# Patient Record
Sex: FEMALE | Race: WHITE | NOT HISPANIC OR LATINO | Employment: UNEMPLOYED | URBAN - NONMETROPOLITAN AREA
[De-identification: names, ages, dates, MRNs, and addresses within clinical notes are randomized per-mention and may not be internally consistent; named-entity substitution may affect disease eponyms.]

---
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
## 2022-01-25 ENCOUNTER — TELEPHONE (OUTPATIENT)
Dept: FAMILY MEDICINE CLINIC | Facility: CLINIC | Age: 22
End: 2022-01-25

## 2023-07-31 ENCOUNTER — APPOINTMENT (EMERGENCY)
Dept: RADIOLOGY | Facility: HOSPITAL | Age: 23
End: 2023-07-31
Payer: COMMERCIAL

## 2023-07-31 ENCOUNTER — HOSPITAL ENCOUNTER (EMERGENCY)
Facility: HOSPITAL | Age: 23
Discharge: HOME | End: 2023-07-31
Attending: EMERGENCY MEDICINE
Payer: COMMERCIAL

## 2023-07-31 VITALS
HEART RATE: 73 BPM | RESPIRATION RATE: 17 BRPM | OXYGEN SATURATION: 97 % | BODY MASS INDEX: 32.43 KG/M2 | HEIGHT: 65 IN | DIASTOLIC BLOOD PRESSURE: 79 MMHG | SYSTOLIC BLOOD PRESSURE: 120 MMHG | WEIGHT: 194.67 LBS | TEMPERATURE: 97.5 F

## 2023-07-31 DIAGNOSIS — S03.00XA: Primary | ICD-10-CM

## 2023-07-31 PROCEDURE — 99284 EMERGENCY DEPT VISIT MOD MDM: CPT | Performed by: EMERGENCY MEDICINE

## 2023-07-31 PROCEDURE — 99283 EMERGENCY DEPT VISIT LOW MDM: CPT

## 2023-07-31 PROCEDURE — 21480 CLTX TMPRMAND DISLC 1ST/SBSQ: CPT | Performed by: EMERGENCY MEDICINE

## 2023-07-31 PROCEDURE — 96374 THER/PROPH/DIAG INJ IV PUSH: CPT

## 2023-07-31 PROCEDURE — 70330 X-RAY EXAM OF JAW JOINTS: CPT

## 2023-07-31 RX ORDER — ONDANSETRON 4 MG/1
4 TABLET, FILM COATED ORAL EVERY 8 HOURS PRN
Qty: 12 TABLET | Refills: 0 | Status: SHIPPED | OUTPATIENT
Start: 2023-07-31

## 2023-07-31 RX ORDER — KETOROLAC TROMETHAMINE 30 MG/ML
30 INJECTION, SOLUTION INTRAMUSCULAR; INTRAVENOUS ONCE
Status: DISCONTINUED | OUTPATIENT
Start: 2023-07-31 | End: 2023-07-31

## 2023-07-31 RX ORDER — FENTANYL CITRATE 50 UG/ML
50 INJECTION, SOLUTION INTRAMUSCULAR; INTRAVENOUS ONCE
Status: COMPLETED | OUTPATIENT
Start: 2023-07-31 | End: 2023-07-31

## 2023-07-31 RX ORDER — NAPROXEN 500 MG/1
500 TABLET ORAL 2 TIMES DAILY WITH MEALS
Qty: 10 TABLET | Refills: 0 | Status: SHIPPED | OUTPATIENT
Start: 2023-07-31 | End: 2023-08-05

## 2023-07-31 RX ORDER — ONDANSETRON 2 MG/ML
4 INJECTION INTRAMUSCULAR; INTRAVENOUS ONCE
Status: DISCONTINUED | OUTPATIENT
Start: 2023-07-31 | End: 2023-07-31 | Stop reason: HOSPADM

## 2023-07-31 RX ORDER — LORAZEPAM 2 MG/ML
1 INJECTION INTRAMUSCULAR ONCE
Status: DISCONTINUED | OUTPATIENT
Start: 2023-07-31 | End: 2023-07-31 | Stop reason: HOSPADM

## 2023-07-31 RX ADMIN — FENTANYL CITRATE 50 MCG: 50 INJECTION, SOLUTION INTRAMUSCULAR; INTRAVENOUS at 19:07

## 2023-07-31 NOTE — ED PROVIDER NOTES
Final Diagnosis:  1. Closed dislocation of mandible        Chief Complaint   Patient presents with   • Jaw Pain     About an hour a ago pt yawned and feels now like her jaw is out of place     HPI  Patient presents for evaluation of inability to close jaw. History is provided by family who are bedside. They state that approximately an hour prior to arrival the patient was yawning and then she was unable to close her mouth. She seems to endorse some pain in her mandibular area but cannot really tell me any other information. Denies that this is ever happened before. No trauma. Unless otherwise specified:  - No language barrier.   - History obtained from patient. - There are no limitations to the history obtained. - Previous charting was reviewed    PMH:   has a past medical history of Anxiety. PSH:   has no past surgical history on file. ROS:  Review of Systems   - 13 point ROS was performed and all are normal unless stated in the history above. - Nursing note reviewed. Vitals reviewed. - Orders placed by myself and/or advanced practitioner / resident. PE:   Vitals:    07/31/23 1708 07/31/23 1947   BP: 120/79    BP Location: Right arm    Pulse: 88 73   Resp: 17    Temp: 97.5 °F (36.4 °C)    TempSrc: Temporal    SpO2: 99% 97%   Weight: 88.3 kg (194 lb 10.7 oz)    Height: 5' 5" (1.651 m)      Vitals reviewed by me. Patient's mandible appears to be dislocated, there is a fullness bilateral Sherrine Grapes over the area of the TMJ/angle of the mandible. There is no obvious bony deformity. Patient's mouth is held agape, intermittent drooling. Unless otherwise specified above:    General: VS reviewed  Appears in NAD    Head: Normocephalic, atraumatic  Eyes: EOM-I.     ENT: Atraumatic external nose and ears. No drooling. Neck: No JVD.     CV: No pallor noted  Lungs:   No tachypnea  No respiratory distress    Abdomen:  Soft, non-tender, non-distended    MSK:   No obvious deformity    Skin: Dry, intact. No obvious rash. Psychiatric/Behavioral: Appropriate mood and affect   Exam: deferred    Physical Exam     Orthopedic injury treatment    Date/Time: 7/31/2023 7:45 PM    Performed by: Jasmin Kingsley MD  Authorized by: Jasmin Kingsley MD    Patient Location:  ED  [place] Protocol:  Consent: Verbal consent obtained. Consent given by: patient      Injury location:  Jaw  Location details:  Bilateral TMJ  Injury type:  Dislocation  Chronicity:  New  Local anesthesia used?: No    General anesthesia used?: No    Sedation type: Anxiolysis  Manipulation performed?: Yes    Reduction method:  Downward posterior pressure  Reduction method:  Downward posterior pressure  Reduction method:  Downward posterior pressure  Reduction method:  Downward posterior pressure  Reduction method:  Downward posterior pressure  Reduction method:  Downward posterior pressure  Reduction successful?: Yes    Range of motion: normal    Patient tolerance:  Patient tolerated the procedure well with no immediate complications       A:  - Nursing note reviewed. XR temporomandibular joints bilateral   ED Interpretation   Mandible dislocation on my interpretation        Orders Placed This Encounter   Procedures   • Orthopedic injury treatment   • XR temporomandibular joints bilateral   • Ambulatory Referral to Oral Maxillofacial Surgery   • Insert peripheral IV     Labs Reviewed - No data to display      Final Diagnosis:  1. Closed dislocation of mandible        P:  -Patient appears to have a dislocated mandible though story is unremarkable. Will obtain x-rays  - X-ray on my interpretation consistent with mandibular dislocation  - Patient was provided with fentanyl for pain, she did not want Ativan. She then allowed me to apply pressure and we were able to reduce her jaw. Will observe patient for period of time.  -Patient observed, no changes in presentation.   Offered medication for pain as well as nausea, patient declined. Will provide prescriptions. Referral to OMFs, return precautions reviewed. Unless otherwise noted the patient's medications were reviewed and they should continue as directed. Medications   LORazepam (ATIVAN) injection 1 mg (0 mg Intravenous Not Given 7/31/23 1907)   ondansetron [facility]) injection 4 mg (4 mg Intravenous Not Given 7/31/23 1948)   fentanyl citrate (PF) 100 MCG/2ML 50 mcg (50 mcg Intravenous Given 7/31/23 1907)     Time reflects when diagnosis was documented in both MDM as applicable and the Disposition within this note     Time User Action Codes Description Comment    7/31/2023  7:48 PM Romina Pulido Add [S03.00XA] Closed dislocation of mandible       ED Disposition     ED Disposition   Discharge    Condition   Stable    Date/Time   Mon Jul 31, 2023  7:47 PM    Comment   Sammie Awan discharge to home/self care. Follow-up Information     Follow up With Specialties Details Why Contact Info    López Armas DMD Oral Maxillofacial Surgery Schedule an appointment as soon as possible for a visit in 1 week  [address] [place] [address].          Patient's Medications   Discharge Prescriptions    NAPROXEN (NAPROSYN) 500 MG TABLET    Take 1 tablet (500 mg total) by mouth 2 (two) times a day with meals for 5 days       Start Date: 7/31/2023 End Date: 8/5/2023       Order Dose: 500 mg       Quantity: 10 tablet    Refills: 0    ONDANSETRON (ZOFRAN) 4 MG TABLET    Take 1 tablet (4 mg total) by mouth every 8 (eight) hours as needed for nausea or vomiting       Start Date: 7/31/2023 End Date: --       Order Dose: 4 mg       Quantity: 12 tablet    Refills: 0       Prior to Admission Medications   Prescriptions Last Dose Informant Patient Reported?  Taking?   citalopram (CeleXA) 10 mg tablet   No No   Sig: Take 1 tablet (10 mg total) by mouth daily   ferrous sulfate 324 (65 Fe) mg   No No   Sig: Take 1 tablet (324 mg total) by mouth 2 (two) times a day before meals   hydrOXYzine pamoate (VISTARIL) 50 mg capsule   No No   Sig: Take 1 capsule (50 mg total) by mouth 3 (three) times a day as needed for anxiety   naproxen (NAPROSYN) 500 mg tablet   No No   Sig: Take 1 tablet (500 mg total) by mouth 2 (two) times a day as needed (for pain)   Patient not taking: Reported on 7/25/2020      Facility-Administered Medications: None       Portions of the record may have been created with voice recognition software. Occasional wrong word or "sound a like" substitutions may have occurred due to the inherent limitations of voice recognition software. Read the chart carefully and recognize, using context, where substitutions have occurred.     Electronically signed by:  MD Angelina Wallis MD  07/31/23 0940

## 2023-10-08 ENCOUNTER — OFFICE VISIT (OUTPATIENT)
Dept: URGENT CARE | Facility: MEDICAL CENTER | Age: 23
End: 2023-10-08
Payer: COMMERCIAL

## 2023-10-08 VITALS
SYSTOLIC BLOOD PRESSURE: 122 MMHG | OXYGEN SATURATION: 98 % | WEIGHT: 170.4 LBS | BODY MASS INDEX: 28.36 KG/M2 | TEMPERATURE: 98.7 F | RESPIRATION RATE: 20 BRPM | HEART RATE: 82 BPM | DIASTOLIC BLOOD PRESSURE: 82 MMHG

## 2023-10-08 DIAGNOSIS — N39.0 URINARY TRACT INFECTION WITHOUT HEMATURIA, SITE UNSPECIFIED: Primary | ICD-10-CM

## 2023-10-08 DIAGNOSIS — R30.0 DYSURIA: ICD-10-CM

## 2023-10-08 LAB
SL AMB  POCT GLUCOSE, UA: NEGATIVE
SL AMB LEUKOCYTE ESTERASE,UA: ABNORMAL
SL AMB POCT BILIRUBIN,UA: NEGATIVE
SL AMB POCT BLOOD,UA: ABNORMAL
SL AMB POCT CLARITY,UA: ABNORMAL
SL AMB POCT COLOR,UA: YELLOW
SL AMB POCT KETONES,UA: NEGATIVE
SL AMB POCT NITRITE,UA: NEGATIVE
SL AMB POCT PH,UA: 5
SL AMB POCT SPECIFIC GRAVITY,UA: 1.01
SL AMB POCT URINE HCG: NEGATIVE
SL AMB POCT URINE PROTEIN: NEGATIVE
SL AMB POCT UROBILINOGEN: 0.2

## 2023-10-08 PROCEDURE — 87086 URINE CULTURE/COLONY COUNT: CPT | Performed by: PHYSICIAN ASSISTANT

## 2023-10-08 PROCEDURE — 81002 URINALYSIS NONAUTO W/O SCOPE: CPT | Performed by: PHYSICIAN ASSISTANT

## 2023-10-08 PROCEDURE — 99212 OFFICE O/P EST SF 10 MIN: CPT | Performed by: PHYSICIAN ASSISTANT

## 2023-10-08 PROCEDURE — 81025 URINE PREGNANCY TEST: CPT | Performed by: PHYSICIAN ASSISTANT

## 2023-10-08 RX ORDER — CEPHALEXIN 500 MG/1
500 CAPSULE ORAL EVERY 8 HOURS SCHEDULED
Qty: 21 CAPSULE | Refills: 0 | Status: SHIPPED | OUTPATIENT
Start: 2023-10-08 | End: 2023-10-15

## 2023-10-08 NOTE — PROGRESS NOTES
uti    [facility]        NAME: Colin Blanton is a 26 y.o. female  : 2000    MRN: [id number]  DATE: 2023  TIME: 2:37 PM    Assessment and Plan   Urinary tract infection without hematuria, site unspecified [N39.0]  1. Urinary tract infection without hematuria, site unspecified  cephalexin (KEFLEX) 500 mg capsule      2. Dysuria  POCT urine dip    Urine culture    POCT urine HCG            Patient Instructions     Start Keflex  Drink plenty of water  If symptoms fail to improve follow up with PCP  Follow up with PCP in 3-5 days. Proceed to  ER if symptoms worsen. Chief Complaint     Chief Complaint   Patient presents with   • Possible UTI     Pain with urination, fatigue and low back pain. Had UTI approx 2 weeks ago and took a few days of Amoxil         History of Present Illness       Patient has been having a 2-week history of burning on urination. She had leftover amoxicillin for which she took today. She is also having low back pain and fatigue but denies fevers. Review of Systems   Review of Systems   Constitutional: Negative for chills and fever. Genitourinary: Positive for dysuria. Negative for flank pain, frequency, urgency and vaginal discharge. Musculoskeletal: Positive for back pain.          Current Medications       Current Outpatient Medications:   •  cephalexin (KEFLEX) 500 mg capsule, Take 1 capsule (500 mg total) by mouth every 8 (eight) hours for 7 days, Disp: 21 capsule, Rfl: 0  •  citalopram (CeleXA) 10 mg tablet, Take 1 tablet (10 mg total) by mouth daily (Patient not taking: Reported on 10/8/2023), Disp: 30 tablet, Rfl: 2  •  ferrous sulfate 324 (65 Fe) mg, Take 1 tablet (324 mg total) by mouth 2 (two) times a day before meals (Patient not taking: Reported on 10/8/2023), Disp: 60 tablet, Rfl: 0  •  hydrOXYzine pamoate (VISTARIL) 50 mg capsule, Take 1 capsule (50 mg total) by mouth 3 (three) times a day as needed for anxiety (Patient not taking: Reported on 10/8/2023), Disp: 15 capsule, Rfl: 0  •  naproxen (NAPROSYN) 500 mg tablet, Take 1 tablet (500 mg total) by mouth 2 (two) times a day as needed (for pain) (Patient not taking: Reported on 7/25/2020), Disp: 30 tablet, Rfl: 0  •  naproxen (Naprosyn) 500 mg tablet, Take 1 tablet (500 mg total) by mouth 2 (two) times a day with meals for 5 days, Disp: 10 tablet, Rfl: 0  •  ondansetron (ZOFRAN) 4 mg tablet, Take 1 tablet (4 mg total) by mouth every 8 (eight) hours as needed for nausea or vomiting (Patient not taking: Reported on 10/8/2023), Disp: 12 tablet, Rfl: 0    Current Allergies     Allergies as of 10/08/2023   • (No Known Allergies)            The following portions of the patient's history were reviewed and updated as appropriate: allergies, current medications, past family history, past medical history, past social history, past surgical history and problem list.     Past Medical History:   Diagnosis Date   • Anxiety        History reviewed. No pertinent surgical history. History reviewed. No pertinent family history. Medications have been verified. Objective   /82   Pulse 82   Temp 98.7 °F (37.1 °C)   Resp 20   Wt 77.3 kg (170 lb 6.4 oz)   SpO2 98%   BMI 28.36 kg/m²   No LMP recorded. Physical Exam     Physical Exam  Vitals and nursing note reviewed. Constitutional:       Appearance: Normal appearance. HENT:      Head: Normocephalic and atraumatic. Cardiovascular:      Rate and Rhythm: Normal rate and regular rhythm. Pulmonary:      Effort: Pulmonary effort is normal.   Abdominal:      Comments: No CVA tenderness   Skin:     General: Skin is warm. Neurological:      Mental Status: She is alert.

## 2023-10-10 LAB — BACTERIA UR CULT: NORMAL

## 2023-10-28 ENCOUNTER — OFFICE VISIT (OUTPATIENT)
Dept: URGENT CARE | Facility: MEDICAL CENTER | Age: 23
End: 2023-10-28
Payer: COMMERCIAL

## 2023-10-28 VITALS
RESPIRATION RATE: 20 BRPM | DIASTOLIC BLOOD PRESSURE: 66 MMHG | OXYGEN SATURATION: 99 % | SYSTOLIC BLOOD PRESSURE: 97 MMHG | TEMPERATURE: 98.6 F | BODY MASS INDEX: 28.59 KG/M2 | HEART RATE: 101 BPM | WEIGHT: 171.8 LBS

## 2023-10-28 DIAGNOSIS — J06.9 ACUTE URI: Primary | ICD-10-CM

## 2023-10-28 PROCEDURE — 99212 OFFICE O/P EST SF 10 MIN: CPT | Performed by: PHYSICIAN ASSISTANT

## 2023-10-28 NOTE — PROGRESS NOTES
[facility]        NAME: Chris Mixon is a 26 y.o. female  : 2000    MRN: [id number]  DATE: 2023  TIME: 12:58 PM    Assessment and Plan   Acute URI [J06.9]  1. Acute URI              Patient Instructions     Tylenol or Ibuprofen as needed for fever or pain  Drink plenty of fluids  Over the Counter cold medication to control symptoms  If symptoms fail to improve follow up with PCP  If symptoms worsen have yourself rechecked   Follow up with PCP in 3-5 days. Proceed to  ER if symptoms worsen. Chief Complaint     Chief Complaint   Patient presents with   • Cold Like Symptoms     Congestion, headache, sore throat, hot, cough         History of Present Illness       Patient presents with a 2 day hx of runny, stuffy nose and cough. Not taking anything to control symptoms. Review of Systems   Review of Systems   Constitutional:  Negative for chills and fever. HENT:  Positive for congestion and sore throat. Negative for rhinorrhea. Respiratory:  Positive for cough. Gastrointestinal:  Positive for nausea. Negative for diarrhea and vomiting. Musculoskeletal:  Negative for myalgias. Neurological:  Positive for headaches.          Current Medications       Current Outpatient Medications:   •  citalopram (CeleXA) 10 mg tablet, Take 1 tablet (10 mg total) by mouth daily (Patient not taking: Reported on 10/8/2023), Disp: 30 tablet, Rfl: 2  •  ferrous sulfate 324 (65 Fe) mg, Take 1 tablet (324 mg total) by mouth 2 (two) times a day before meals (Patient not taking: Reported on 10/8/2023), Disp: 60 tablet, Rfl: 0  •  hydrOXYzine pamoate (VISTARIL) 50 mg capsule, Take 1 capsule (50 mg total) by mouth 3 (three) times a day as needed for anxiety (Patient not taking: Reported on 10/8/2023), Disp: 15 capsule, Rfl: 0  •  naproxen (NAPROSYN) 500 mg tablet, Take 1 tablet (500 mg total) by mouth 2 (two) times a day as needed (for pain) (Patient not taking: Reported on 2020), Disp: 30 tablet, Rfl: 0  •  naproxen (Naprosyn) 500 mg tablet, Take 1 tablet (500 mg total) by mouth 2 (two) times a day with meals for 5 days, Disp: 10 tablet, Rfl: 0  •  ondansetron (ZOFRAN) 4 mg tablet, Take 1 tablet (4 mg total) by mouth every 8 (eight) hours as needed for nausea or vomiting (Patient not taking: Reported on 10/8/2023), Disp: 12 tablet, Rfl: 0    Current Allergies     Allergies as of 10/28/2023   • (No Known Allergies)            The following portions of the patient's history were reviewed and updated as appropriate: allergies, current medications, past family history, past medical history, past social history, past surgical history and problem list.     Past Medical History:   Diagnosis Date   • Anxiety        History reviewed. No pertinent surgical history. History reviewed. No pertinent family history. Medications have been verified. Objective   BP 97/66   Pulse 101   Temp 98.6 °F (37 °C)   Resp 20   Wt 77.9 kg (171 lb 12.8 oz)   SpO2 99%   BMI 28.59 kg/m²   No LMP recorded. Physical Exam     Physical Exam  Vitals and nursing note reviewed. Constitutional:       Appearance: Normal appearance. HENT:      Head: Normocephalic and atraumatic. Right Ear: Tympanic membrane normal.      Left Ear: Tympanic membrane normal.      Nose: Congestion present. Mouth/Throat:      Mouth: Mucous membranes are moist.      Pharynx: Oropharynx is clear. Eyes:      Conjunctiva/sclera: Conjunctivae normal.   Cardiovascular:      Rate and Rhythm: Normal rate and regular rhythm. Heart sounds: Normal heart sounds. Pulmonary:      Effort: Pulmonary effort is normal.      Breath sounds: Normal breath sounds. Musculoskeletal:      Cervical back: Neck supple. Lymphadenopathy:      Cervical: No cervical adenopathy. Skin:     General: Skin is warm. Neurological:      Mental Status: She is alert.

## 2024-02-06 ENCOUNTER — OFFICE VISIT (OUTPATIENT)
Dept: URGENT CARE | Facility: MEDICAL CENTER | Age: 24
End: 2024-02-06
Payer: COMMERCIAL

## 2024-02-06 VITALS
DIASTOLIC BLOOD PRESSURE: 68 MMHG | HEART RATE: 102 BPM | WEIGHT: 177 LBS | BODY MASS INDEX: 29.45 KG/M2 | RESPIRATION RATE: 18 BRPM | SYSTOLIC BLOOD PRESSURE: 106 MMHG | OXYGEN SATURATION: 99 % | TEMPERATURE: 97.9 F

## 2024-02-06 DIAGNOSIS — R10.30 LOWER ABDOMINAL PAIN: Primary | ICD-10-CM

## 2024-02-06 PROCEDURE — G0381 LEV 2 HOSP TYPE B ED VISIT: HCPCS | Performed by: PHYSICIAN ASSISTANT

## 2024-02-06 NOTE — PROGRESS NOTES
[facility]        NAME: Evonne Awan is a 23 y.o. female  : 2000    MRN: [id number]  DATE: 2024  TIME: 5:27 PM    Assessment and Plan   Lower abdominal pain [R10.30]  1. Lower abdominal pain              Patient Instructions     Tylenol or Ibuprofen for pain  If pain worsen go to the ER for further evaluation  Follow up with PCP in 3-5 days.  Proceed to  ER if symptoms worsen.    Chief Complaint     Chief Complaint   Patient presents with   • Abdominal Pain     Lower abdominal pain started last night. Took Motrin and kaopectate.           History of Present Illness       Patient presents with lower abdominal pain which started last night. Pain was severe and improved throughout the day. Took Motrin with some relief. It is not associated with any nausea vomiting or diarrhea.  Patient took Motrin and Kaopectate for pain.  She denies urinary symptoms.      Review of Systems   Review of Systems   Constitutional:  Negative for fever.   Gastrointestinal:  Positive for abdominal pain. Negative for diarrhea, nausea and vomiting.   Genitourinary:  Negative for difficulty urinating.         Current Medications       Current Outpatient Medications:   •  citalopram (CeleXA) 10 mg tablet, Take 1 tablet (10 mg total) by mouth daily (Patient not taking: Reported on 10/8/2023), Disp: 30 tablet, Rfl: 2  •  ferrous sulfate 324 (65 Fe) mg, Take 1 tablet (324 mg total) by mouth 2 (two) times a day before meals (Patient not taking: Reported on 10/8/2023), Disp: 60 tablet, Rfl: 0  •  hydrOXYzine pamoate (VISTARIL) 50 mg capsule, Take 1 capsule (50 mg total) by mouth 3 (three) times a day as needed for anxiety (Patient not taking: Reported on 10/8/2023), Disp: 15 capsule, Rfl: 0  •  naproxen (NAPROSYN) 500 mg tablet, Take 1 tablet (500 mg total) by mouth 2 (two) times a day as needed (for pain) (Patient not taking: Reported on 2020), Disp: 30 tablet, Rfl: 0  •  naproxen (Naprosyn) 500 mg tablet, Take 1  tablet (500 mg total) by mouth 2 (two) times a day with meals for 5 days, Disp: 10 tablet, Rfl: 0  •  ondansetron (ZOFRAN) 4 mg tablet, Take 1 tablet (4 mg total) by mouth every 8 (eight) hours as needed for nausea or vomiting (Patient not taking: Reported on 10/8/2023), Disp: 12 tablet, Rfl: 0    Current Allergies     Allergies as of 02/06/2024   • (No Known Allergies)            The following portions of the patient's history were reviewed and updated as appropriate: allergies, current medications, past family history, past medical history, past social history, past surgical history and problem list.     Past Medical History:   Diagnosis Date   • Anxiety        History reviewed. No pertinent surgical history.    History reviewed. No pertinent family history.      Medications have been verified.        Objective   /68   Pulse 102   Temp 97.9 °F (36.6 °C)   Resp 18   Wt 80.3 kg (177 lb)   LMP 01/24/2024   SpO2 99%   BMI 29.45 kg/m²   Patient's last menstrual period was 01/24/2024.       Physical Exam     Physical Exam  Vitals and nursing note reviewed.   Constitutional:       Appearance: She is well-developed.   HENT:      Head: Normocephalic and atraumatic.   Cardiovascular:      Rate and Rhythm: Normal rate.   Pulmonary:      Effort: Pulmonary effort is normal.   Abdominal:      General: Abdomen is flat.      Palpations: Abdomen is soft.      Tenderness: There is abdominal tenderness (mild, which resolved when palpated a second time) in the right lower quadrant and left lower quadrant. There is no guarding or rebound. Negative signs include Rovsing's sign and psoas sign.   Skin:     General: Skin is warm.   Neurological:      Mental Status: She is alert.

## 2024-04-08 ENCOUNTER — HOSPITAL ENCOUNTER (EMERGENCY)
Facility: HOSPITAL | Age: 24
Discharge: HOME | End: 2024-04-08
Attending: EMERGENCY MEDICINE | Admitting: EMERGENCY MEDICINE
Payer: COMMERCIAL

## 2024-04-08 VITALS
RESPIRATION RATE: 18 BRPM | SYSTOLIC BLOOD PRESSURE: 103 MMHG | DIASTOLIC BLOOD PRESSURE: 57 MMHG | HEART RATE: 86 BPM | TEMPERATURE: 97.4 F | OXYGEN SATURATION: 95 %

## 2024-04-08 DIAGNOSIS — E86.0 DEHYDRATION: ICD-10-CM

## 2024-04-08 DIAGNOSIS — R11.2 NAUSEA AND VOMITING: Primary | ICD-10-CM

## 2024-04-08 DIAGNOSIS — N64.4 NIPPLE TENDERNESS: ICD-10-CM

## 2024-04-08 LAB
ALBUMIN SERPL BCP-MCNC: 4.5 G/DL (ref 3.5–5)
ALP SERPL-CCNC: 66 U/L (ref 34–104)
ALT SERPL W P-5'-P-CCNC: 11 U/L (ref 7–52)
ANION GAP SERPL CALCULATED.3IONS-SCNC: 9 MMOL/L (ref 4–13)
AST SERPL W P-5'-P-CCNC: 25 U/L (ref 13–39)
BASOPHILS # BLD AUTO: 0.02 THOUSANDS/ÂΜL (ref 0–0.1)
BASOPHILS NFR BLD AUTO: 0 % (ref 0–1)
BILIRUB SERPL-MCNC: 0.36 MG/DL (ref 0.2–1)
BILIRUB UR QL STRIP: NEGATIVE
BUN SERPL-MCNC: 11 MG/DL (ref 5–25)
CALCIUM SERPL-MCNC: 9.6 MG/DL (ref 8.4–10.2)
CHLORIDE SERPL-SCNC: 104 MMOL/L (ref 96–108)
CLARITY UR: CLEAR
CO2 SERPL-SCNC: 23 MMOL/L (ref 21–32)
COLOR UR: YELLOW
CREAT SERPL-MCNC: 0.6 MG/DL (ref 0.6–1.3)
EOSINOPHIL # BLD AUTO: 0.08 THOUSAND/ÂΜL (ref 0–0.61)
EOSINOPHIL NFR BLD AUTO: 1 % (ref 0–6)
ERYTHROCYTE [DISTWIDTH] IN BLOOD BY AUTOMATED COUNT: 17.9 % (ref 11.6–15.1)
EXT PREGNANCY TEST URINE: NEGATIVE
EXT. CONTROL: NORMAL
GFR SERPL CREATININE-BSD FRML MDRD: 128 ML/MIN/1.73SQ M
GLUCOSE SERPL-MCNC: 88 MG/DL (ref 65–140)
GLUCOSE UR STRIP-MCNC: NEGATIVE MG/DL
HCT VFR BLD AUTO: 34 % (ref 34.8–46.1)
HGB BLD-MCNC: 9.9 G/DL (ref 11.5–15.4)
HGB UR QL STRIP.AUTO: NEGATIVE
IMM GRANULOCYTES # BLD AUTO: 0.01 THOUSAND/UL (ref 0–0.2)
IMM GRANULOCYTES NFR BLD AUTO: 0 % (ref 0–2)
KETONES UR STRIP-MCNC: ABNORMAL MG/DL
LEUKOCYTE ESTERASE UR QL STRIP: NEGATIVE
LIPASE SERPL-CCNC: 12 U/L (ref 11–82)
LYMPHOCYTES # BLD AUTO: 1.54 THOUSANDS/ÂΜL (ref 0.6–4.47)
LYMPHOCYTES NFR BLD AUTO: 26 % (ref 14–44)
MCH RBC QN AUTO: 20.5 PG (ref 26.8–34.3)
MCHC RBC AUTO-ENTMCNC: 29.1 G/DL (ref 31.4–37.4)
MCV RBC AUTO: 70 FL (ref 82–98)
MONOCYTES # BLD AUTO: 0.55 THOUSAND/ÂΜL (ref 0.17–1.22)
MONOCYTES NFR BLD AUTO: 9 % (ref 4–12)
NEUTROPHILS # BLD AUTO: 3.75 THOUSANDS/ÂΜL (ref 1.85–7.62)
NEUTS SEG NFR BLD AUTO: 64 % (ref 43–75)
NITRITE UR QL STRIP: NEGATIVE
NRBC BLD AUTO-RTO: 0 /100 WBCS
PH UR STRIP.AUTO: 7.5 [PH]
PLATELET # BLD AUTO: 277 THOUSANDS/UL (ref 149–390)
PMV BLD AUTO: 10.4 FL (ref 8.9–12.7)
POTASSIUM SERPL-SCNC: 3.9 MMOL/L (ref 3.5–5.3)
PROT SERPL-MCNC: 8.1 G/DL (ref 6.4–8.4)
PROT UR STRIP-MCNC: NEGATIVE MG/DL
RBC # BLD AUTO: 4.84 MILLION/UL (ref 3.81–5.12)
SODIUM SERPL-SCNC: 136 MMOL/L (ref 135–147)
SP GR UR STRIP.AUTO: 1.02 (ref 1–1.03)
UROBILINOGEN UR QL STRIP.AUTO: 0.2 E.U./DL
WBC # BLD AUTO: 5.95 THOUSAND/UL (ref 4.31–10.16)

## 2024-04-08 PROCEDURE — 80053 COMPREHEN METABOLIC PANEL: CPT | Performed by: EMERGENCY MEDICINE

## 2024-04-08 PROCEDURE — 81003 URINALYSIS AUTO W/O SCOPE: CPT | Performed by: EMERGENCY MEDICINE

## 2024-04-08 PROCEDURE — 81025 URINE PREGNANCY TEST: CPT | Performed by: EMERGENCY MEDICINE

## 2024-04-08 PROCEDURE — 85025 COMPLETE CBC W/AUTO DIFF WBC: CPT | Performed by: EMERGENCY MEDICINE

## 2024-04-08 PROCEDURE — 83690 ASSAY OF LIPASE: CPT | Performed by: EMERGENCY MEDICINE

## 2024-04-08 PROCEDURE — 99284 EMERGENCY DEPT VISIT MOD MDM: CPT | Performed by: EMERGENCY MEDICINE

## 2024-04-08 PROCEDURE — 36415 COLL VENOUS BLD VENIPUNCTURE: CPT | Performed by: EMERGENCY MEDICINE

## 2024-04-08 RX ORDER — ONDANSETRON 2 MG/ML
4 INJECTION INTRAMUSCULAR; INTRAVENOUS ONCE
Status: COMPLETED | OUTPATIENT
Start: 2024-04-08 | End: 2024-04-08

## 2024-04-08 RX ORDER — ACETAMINOPHEN 325 MG/1
650 TABLET ORAL ONCE
Status: COMPLETED | OUTPATIENT
Start: 2024-04-08 | End: 2024-04-08

## 2024-04-08 RX ORDER — FAMOTIDINE 10 MG/ML
40 INJECTION, SOLUTION INTRAVENOUS ONCE
Status: COMPLETED | OUTPATIENT
Start: 2024-04-08 | End: 2024-04-08

## 2024-04-08 RX ORDER — ONDANSETRON 8 MG/1
8 TABLET, ORALLY DISINTEGRATING ORAL EVERY 8 HOURS PRN
Qty: 20 TABLET | Refills: 0 | Status: SHIPPED | OUTPATIENT
Start: 2024-04-08

## 2024-04-08 RX ADMIN — SODIUM CHLORIDE, SODIUM LACTATE, POTASSIUM CHLORIDE, AND CALCIUM CHLORIDE 1000 ML: .6; .31; .03; .02 INJECTION, SOLUTION INTRAVENOUS at 14:30

## 2024-04-08 RX ADMIN — ONDANSETRON 4 MG: 2 INJECTION INTRAMUSCULAR; INTRAVENOUS at 14:30

## 2024-04-08 RX ADMIN — FAMOTIDINE 40 MG: 10 INJECTION, SOLUTION INTRAVENOUS at 14:30

## 2024-04-08 RX ADMIN — ONDANSETRON 4 MG: 2 INJECTION INTRAMUSCULAR; INTRAVENOUS at 16:28

## 2024-04-08 RX ADMIN — ACETAMINOPHEN 650 MG: 325 TABLET, FILM COATED ORAL at 16:28

## 2024-04-08 NOTE — DISCHARGE INSTRUCTIONS
zofran as needed for nausea.  Drink 2 to 3 liters of fliuds daily - water, diluted apple juice, sports drinks,   Bannas rice applesauce toast initially then once stomach feels better/nausea improved can advance as tolerated.  Tylenol 650mg every 6 hours as needed for pain, fever (max 3000mg in 24 hours)  Famotidine (pepcid) 20mg twice daily  twice daily to cut stomach acid for 1 week then as needed   Return with fever diarrhea inability to keep fluids down lightheadedness or any new or worsening symptoms

## 2024-04-08 NOTE — ED PROVIDER NOTES
History  Chief Complaint   Patient presents with    Nausea     Has been having nausea and vomitting for the last few days. Gets these sharp pains that go across her breast. Not able to keep anything down. Also has complaints of a headache that started today.     23 yo otherwise healthy female presents with several day history of nausea and vomiting whenever she eats she will throw up she denies hematemesis or coffee-ground emesis.  She has had no sick contacts or travel.  She denies any diarrhea she has occasional abdominal pain no fever has had occasional chills no cough or upper respiratory complaints she does complain of an occasional sharp breast pain she denies chest pain there is no shortness of breath or pleuritic pain the pain is localized more to the nipples there is been no lumps drainage or discharge she denies today she did develop a headache which is generalized she had no visual disturbance numbness paresthesias lightheadedness no history of trauma or falls last menstrual period 315 no rashes. Denies heart burn  Past medical history anxiety past surgical history none medications none        Prior to Admission Medications   Prescriptions Last Dose Informant Patient Reported? Taking?   citalopram (CeleXA) 10 mg tablet   No No   Sig: Take 1 tablet (10 mg total) by mouth daily   Patient not taking: Reported on 10/8/2023   ferrous sulfate 324 (65 Fe) mg   No No   Sig: Take 1 tablet (324 mg total) by mouth 2 (two) times a day before meals   Patient not taking: Reported on 10/8/2023   hydrOXYzine pamoate (VISTARIL) 50 mg capsule   No No   Sig: Take 1 capsule (50 mg total) by mouth 3 (three) times a day as needed for anxiety   Patient not taking: Reported on 10/8/2023   naproxen (NAPROSYN) 500 mg tablet   No No   Sig: Take 1 tablet (500 mg total) by mouth 2 (two) times a day as needed (for pain)   Patient not taking: Reported on 7/25/2020   naproxen (Naprosyn) 500 mg tablet   No No   Sig: Take 1 tablet (500 mg  total) by mouth 2 (two) times a day with meals for 5 days   ondansetron (ZOFRAN) 4 mg tablet   No No   Sig: Take 1 tablet (4 mg total) by mouth every 8 (eight) hours as needed for nausea or vomiting   Patient not taking: Reported on 10/8/2023      Facility-Administered Medications: None       Past Medical History:   Diagnosis Date    Anxiety        History reviewed. No pertinent surgical history.    History reviewed. No pertinent family history.  I have reviewed and agree with the history as documented.    E-Cigarette/Vaping    E-Cigarette Use Never User      E-Cigarette/Vaping Substances     Social History     Tobacco Use    Smoking status: Never    Smokeless tobacco: Never   Vaping Use    Vaping status: Never Used   Substance Use Topics    Alcohol use: Not Currently    Drug use: Never       Review of Systems   Constitutional:  Positive for activity change and appetite change.   Respiratory:  Negative for cough and shortness of breath.    Cardiovascular:  Negative for chest pain and leg swelling.   Gastrointestinal:  Positive for abdominal pain, nausea and vomiting. Negative for diarrhea.   Genitourinary:  Negative for decreased urine volume, difficulty urinating and flank pain.   Musculoskeletal:  Negative for back pain and myalgias.   Skin:  Negative for rash.   Neurological:  Positive for headaches. Negative for weakness and light-headedness.   Psychiatric/Behavioral:  Negative for confusion.    All other systems reviewed and are negative.      Physical Exam  Physical Exam  Vitals and nursing note reviewed.   Constitutional:       General: She is not in acute distress.     Appearance: She is not ill-appearing, toxic-appearing or diaphoretic.   HENT:      Head: Normocephalic.      Right Ear: Tympanic membrane normal.      Left Ear: Tympanic membrane normal.      Nose: Nose normal. No congestion or rhinorrhea.      Mouth/Throat:      Mouth: Mucous membranes are moist.      Pharynx: No oropharyngeal exudate or  posterior oropharyngeal erythema.   Eyes:      General:         Right eye: No discharge.         Left eye: No discharge.      Extraocular Movements: Extraocular movements intact.      Conjunctiva/sclera: Conjunctivae normal.      Pupils: Pupils are equal, round, and reactive to light.   Cardiovascular:      Rate and Rhythm: Normal rate and regular rhythm.      Pulses: Normal pulses.   Pulmonary:      Effort: Pulmonary effort is normal. No respiratory distress.      Breath sounds: Normal breath sounds. No stridor. No wheezing, rhonchi or rales.      Comments: Sats 95% on RA; breast exam done with Lolly SPAIN as chaparone. Breast are symmetric without skin changes dimpling abnormal lie erythema or induration. No masses palpable no nipple discharge  Abdominal:      General: Bowel sounds are normal. There is no distension.      Tenderness: There is no abdominal tenderness. There is no right CVA tenderness, left CVA tenderness or guarding.   Musculoskeletal:         General: Normal range of motion.      Cervical back: Normal range of motion and neck supple. No rigidity or tenderness.      Right lower leg: No edema.      Left lower leg: No edema.   Lymphadenopathy:      Cervical: No cervical adenopathy.   Skin:     General: Skin is warm and dry.      Capillary Refill: Capillary refill takes less than 2 seconds.   Neurological:      General: No focal deficit present.      Mental Status: She is alert.      Cranial Nerves: No cranial nerve deficit.      Sensory: No sensory deficit.      Motor: No weakness.      Coordination: Coordination normal.   Psychiatric:      Comments: flat         Vital Signs  ED Triage Vitals   Temperature Pulse Respirations Blood Pressure SpO2   04/08/24 1242 04/08/24 1242 04/08/24 1242 04/08/24 1247 04/08/24 1242   (!) 97.4 °F (36.3 °C) 87 18 119/73 95 %      Temp Source Heart Rate Source Patient Position - Orthostatic VS BP Location FiO2 (%)   04/08/24 1242 04/08/24 1242 04/08/24 1605 04/08/24 1247  --   Temporal Monitor Lying Left arm       Pain Score       04/08/24 1242       5           Vitals:    04/08/24 1242 04/08/24 1247 04/08/24 1605   BP:  119/73 103/57   Pulse: 87  86   Patient Position - Orthostatic VS:   Lying         Visual Acuity      ED Medications  Medications   ondansetron (ZOFRAN) injection 4 mg (4 mg Intravenous Given 4/8/24 1430)   Famotidine (PF) (PEPCID) injection 40 mg (40 mg Intravenous Given 4/8/24 1430)   lactated ringers bolus 1,000 mL (0 mL Intravenous Stopped 4/8/24 1630)   ondansetron (ZOFRAN) injection 4 mg (4 mg Intravenous Given 4/8/24 1628)   acetaminophen (TYLENOL) tablet 650 mg (650 mg Oral Given 4/8/24 1628)       Diagnostic Studies  Results Reviewed       Procedure Component Value Units Date/Time    UA w Reflex to Microscopic w Reflex to Culture [[id number]]  (Abnormal) Collected: 04/08/24 1530    Lab Status: Final result Specimen: Urine, Clean Catch Updated: 04/08/24 1536     Color, UA Yellow     Clarity, UA Clear     Specific Gravity, UA 1.020     pH, UA 7.5     Leukocytes, UA Negative     Nitrite, UA Negative     Protein, UA Negative mg/dl      Glucose, UA Negative mg/dl      Ketones, UA 15 (1+) mg/dl      Urobilinogen, UA 0.2 E.U./dl      Bilirubin, UA Negative     Occult Blood, UA Negative    POCT pregnancy, urine [[id number]]  (Normal) Resulted: 04/08/24 1531    Lab Status: Final result Updated: 04/08/24 1531     EXT Preg Test, Ur Negative     Control Valid    Comprehensive metabolic panel [[id number]] Collected: 04/08/24 1412    Lab Status: Final result Specimen: Blood from Arm, Left Updated: 04/08/24 1439     Sodium 136 mmol/L      Potassium 3.9 mmol/L      Chloride 104 mmol/L      CO2 23 mmol/L      ANION GAP 9 mmol/L      BUN 11 mg/dL      Creatinine 0.60 mg/dL      Glucose 88 mg/dL      Calcium 9.6 mg/dL      AST 25 U/L      ALT 11 U/L      Alkaline Phosphatase 66 U/L      Total Protein 8.1 g/dL      Albumin 4.5 g/dL      Total Bilirubin 0.36 mg/dL      eGFR 128  ml/min/1.73sq m     Narrative:      National Kid[employer] Disease Foundation guidelines for Chronic Kidney Disease (CKD):     Stage 1 with normal or high GFR (GFR > 90 mL/min/1.73 square meters)    Stage 2 Mild CKD (GFR = 60-89 mL/min/1.73 square meters)    Stage 3A Moderate CKD (GFR = 45-59 mL/min/1.73 square meters)    Stage 3B Moderate CKD (GFR = 30-44 mL/min/1.73 square meters)    Stage 4 Severe CKD (GFR = 15-29 mL/min/1.73 square meters)    Stage 5 End Stage CKD (GFR <15 mL/min/1.73 square meters)  Note: GFR calculation is accurate only with a steady state creatinine    Lipase [[id number]]  (Normal) Collected: 04/08/24 1412    Lab Status: Final result Specimen: Blood from Arm, Left Updated: 04/08/24 1439     Lipase 12 u/L     CBC and differential [[id number]]  (Abnormal) Collected: 04/08/24 1412    Lab Status: Final result Specimen: Blood from Arm, Left Updated: 04/08/24 1423     WBC 5.95 Thousand/uL      RBC 4.84 Million/uL      Hemoglobin 9.9 g/dL      Hematocrit 34.0 %      MCV 70 fL      MCH 20.5 pg      MCHC 29.1 g/dL      RDW 17.9 %      MPV 10.4 fL      Platelets 277 Thousands/uL      nRBC 0 /100 WBCs      Neutrophils Relative 64 %      Immature Grans % 0 %      Lymphocytes Relative 26 %      Monocytes Relative 9 %      Eosinophils Relative 1 %      Basophils Relative 0 %      Neutrophils Absolute 3.75 Thousands/µL      Absolute Immature Grans 0.01 Thousand/uL      Absolute Lymphocytes 1.54 Thousands/µL      Absolute Monocytes 0.55 Thousand/µL      Eosinophils Absolute 0.08 Thousand/µL      Basophils Absolute 0.02 Thousands/µL                    No orders to display              Procedures  Procedures         ED Course  ED Course as of 04/09/24 0118   Mon Apr 08, 2024   1437 Asked patient when only myself and Norma SPAIN were present feels safe in her relationships does not feel threatened no one is harming her    1441 Anemia improved from 7.8  - 3 years ago   1614 On recheck reviewed labs tolerating sips still  have some nausea will re-dose zofran breast exam normal discussed follow up with gyn provider or breast surgeon if symptoms persisnt will be provided with contact info   8573 Patient feels improved no further emesis in ED requesting discharge                               SBIRT 20yo+      Flowsheet Row Most Recent Value   Initial Alcohol Screen: US AUDIT-C     1. How often do you have a drink containing alcohol? 0 Filed at: 04/08/2024 1241   2. How many drinks containing alcohol do you have on a typical day you are drinking?  0 Filed at: 04/08/2024 1241   3a. Male UNDER 65: How often do you have five or more drinks on one occasion? 0 Filed at: 04/08/2024 1241   3b. FEMALE Any Age, or MALE 65+: How often do you have 4 or more drinks on one occassion? 0 Filed at: 04/08/2024 1241   Audit-C Score 0 Filed at: 04/08/2024 1241   BRYN: How many times in the past year have you...    Used an illegal drug or used a prescription medication for non-medical reasons? Never Filed at: 04/08/2024 1241                      Medical Decision Making  Mdm: 24-year-old female with nausea and vomiting over the last several days unable to keep anything down.  Patient's abdominal exam is unremarkable with no reproducible tenderness not consistent with a surgical abdomen.  Patient does have breast pain which is random and intermittent none currently and is localized to the nipple area we will proceed with examination with an RN present.  Patient is not concerned about any specific lumps.  Will initiate symptomatic management ondansetron IV fluid hydration will check electrolytes evaluate for leukocytosis check urine and urine pregnancy test.  Not presentation is not consistent with acute coronary syndrome.  She is not experiencing any diarrhea.    Amount and/or Complexity of Data Reviewed  Labs: ordered.    Risk  OTC drugs.  Prescription drug management.             Disposition  Final diagnoses:   Nausea and vomiting   Dehydration   Nipple  tenderness - intermittant bilateral     Time reflects when diagnosis was documented in both MDM as applicable and the Disposition within this note       Time User Action Codes Description Comment    4/8/2024  4:15 PM Chanel Yeh Add [R11.2] Nausea and vomiting     4/8/2024  4:16 PM Chanel Yeh Add [E86.0] Dehydration     4/8/2024  4:16 PM Chanel Yeh Add [N64.4] Nipple tenderness     4/8/2024  4:16 PM Chanel Yeh Modify [N64.4] Nipple tenderness intermittant bilateral          ED Disposition       ED Disposition   Discharge    Condition   Stable    Date/Time   Mon Apr 8, 2024 1808    Comment   Evonnechuyita Mckeonjasen discharge to home/self care.                   Follow-up Information       Follow up With Specialties Details Why Contact Info Additional Information    [facility]  establish with family doctor [address] [place] [address] [phone] [address], [id number] [phone]    [facility] Obstetrics and Gynecology  futher eval of any persistant nipple discomfort [address] [facility] [address] [phone] [facility], [address], [place], [address] [phone]    Mely Barajas MD General Surgery, Surgical Oncology Call in 1 day futher evauation of any persistant nipple discomfort [address] [facility] [address]  [phone]               Discharge Medication List as of 4/8/2024  6:13 PM        START taking these medications    Details   ondansetron (Zofran ODT) 8 mg disintegrating tablet Take 1 tablet (8 mg total) by mouth every 8 (eight) hours as needed for nausea or vomiting, Starting Mon 4/8/2024, Normal           CONTINUE these medications which have NOT CHANGED    Details   citalopram (CeleXA) 10 mg tablet Take 1  tablet (10 mg total) by mouth daily, Starting Fri 9/11/2020, Normal      ferrous sulfate 324 (65 Fe) mg Take 1 tablet (324 mg total) by mouth 2 (two) times a day before meals, Starting Fri 9/11/2020, Normal      hydrOXYzine pamoate (VISTARIL) 50 mg capsule Take 1 capsule (50 mg total) by mouth 3 (three) times a day as needed for anxiety, Starting Sat 7/25/2020, Normal      naproxen (NAPROSYN) 500 mg tablet Take 1 tablet (500 mg total) by mouth 2 (two) times a day as needed (for pain), Starting Fri 3/15/2019, Print      ondansetron (ZOFRAN) 4 mg tablet Take 1 tablet (4 mg total) by mouth every 8 (eight) hours as needed for nausea or vomiting, Starting Mon 7/31/2023, Normal             No discharge procedures on file.    PDMP Review       None            ED Provider  Electronically Signed by             Chanel Yeh MD  04/09/24 0118

## 2024-07-19 ENCOUNTER — APPOINTMENT (EMERGENCY)
Dept: RADIOLOGY | Facility: HOSPITAL | Age: 24
End: 2024-07-19
Payer: COMMERCIAL

## 2024-07-19 ENCOUNTER — HOSPITAL ENCOUNTER (EMERGENCY)
Facility: HOSPITAL | Age: 24
Discharge: HOME | End: 2024-07-19
Attending: EMERGENCY MEDICINE
Payer: COMMERCIAL

## 2024-07-19 VITALS
DIASTOLIC BLOOD PRESSURE: 70 MMHG | TEMPERATURE: 98 F | RESPIRATION RATE: 17 BRPM | HEART RATE: 70 BPM | SYSTOLIC BLOOD PRESSURE: 121 MMHG | OXYGEN SATURATION: 99 %

## 2024-07-19 DIAGNOSIS — S63.502A LEFT WRIST SPRAIN: Primary | ICD-10-CM

## 2024-07-19 PROCEDURE — 99284 EMERGENCY DEPT VISIT MOD MDM: CPT | Performed by: EMERGENCY MEDICINE

## 2024-07-19 PROCEDURE — 73110 X-RAY EXAM OF WRIST: CPT

## 2024-07-19 PROCEDURE — 99283 EMERGENCY DEPT VISIT LOW MDM: CPT

## 2024-07-19 PROCEDURE — 73130 X-RAY EXAM OF HAND: CPT

## 2024-07-19 RX ORDER — ACETAMINOPHEN 325 MG/1
975 TABLET ORAL ONCE
Status: COMPLETED | OUTPATIENT
Start: 2024-07-19 | End: 2024-07-19

## 2024-07-19 RX ADMIN — ACETAMINOPHEN 975 MG: 325 TABLET, FILM COATED ORAL at 14:49

## 2024-07-19 NOTE — DISCHARGE INSTRUCTIONS
Wear splint for comfort but do recommend wear it at night  Aleve 2 tabs twice daily with food OR ibuprofen 200-800mg every 8 hours with food as needed for pain   Tylenol 650mg every 6 hours as needed for pain, fever (max 3000mg in 24 hours)   Return with swelling redness fever worsening or change in pain

## 2024-07-19 NOTE — Clinical Note
Evonne Awan was seen and treated in our emergency department on 7/19/2024.                Diagnosis:     Evonne  may return to work on return date.    She may return on this date: 07/25/2024         If you have any questions or concerns, please don't hesitate to call.      Chanel Yeh MD    ______________________________           _______________          _______________  Hospital Representative                              Date                                Time

## 2024-07-19 NOTE — ED PROVIDER NOTES
History  Chief Complaint   Patient presents with    Wrist Pain     Left wrist, no trauma, increasing pain over the last few days     23 yo female presents with 1 week (triage said couple of day states longer) hx of left wrist pain. It involves the dorsum of the left hand and distal wrist. No hx of trauma no fever rash swelling weakness numbness or tingling just hurts to move it. No triggering event. No other joint involvement. No prior hx. No tick bites  PMHX anx PSHX: none        Prior to Admission Medications   Prescriptions Last Dose Informant Patient Reported? Taking?   citalopram (CeleXA) 10 mg tablet   No No   Sig: Take 1 tablet (10 mg total) by mouth daily   Patient not taking: Reported on 10/8/2023   ferrous sulfate 324 (65 Fe) mg   No No   Sig: Take 1 tablet (324 mg total) by mouth 2 (two) times a day before meals   Patient not taking: Reported on 10/8/2023   hydrOXYzine pamoate (VISTARIL) 50 mg capsule   No No   Sig: Take 1 capsule (50 mg total) by mouth 3 (three) times a day as needed for anxiety   Patient not taking: Reported on 10/8/2023   naproxen (NAPROSYN) 500 mg tablet   No No   Sig: Take 1 tablet (500 mg total) by mouth 2 (two) times a day as needed (for pain)   Patient not taking: Reported on 7/25/2020   naproxen (Naprosyn) 500 mg tablet   No No   Sig: Take 1 tablet (500 mg total) by mouth 2 (two) times a day with meals for 5 days   ondansetron (ZOFRAN) 4 mg tablet   No No   Sig: Take 1 tablet (4 mg total) by mouth every 8 (eight) hours as needed for nausea or vomiting   Patient not taking: Reported on 10/8/2023   ondansetron (Zofran ODT) 8 mg disintegrating tablet   No No   Sig: Take 1 tablet (8 mg total) by mouth every 8 (eight) hours as needed for nausea or vomiting      Facility-Administered Medications: None       Past Medical History:   Diagnosis Date    Anxiety        No past surgical history on file.    No family history on file.  I have reviewed and agree with the history as  documented.    E-Cigarette/Vaping    E-Cigarette Use Never User      E-Cigarette/Vaping Substances     Social History     Tobacco Use    Smoking status: Never    Smokeless tobacco: Never   Vaping Use    Vaping status: Never Used   Substance Use Topics    Alcohol use: Not Currently    Drug use: Never       Review of Systems   Constitutional:  Positive for activity change. Negative for chills and fever.   Musculoskeletal:  Positive for arthralgias (left wrist). Negative for joint swelling, myalgias, neck pain and neck stiffness.   Skin:  Negative for rash and wound.   Neurological:  Negative for weakness and numbness.       Physical Exam  Physical Exam  Vitals and nursing note reviewed.   Constitutional:       General: She is not in acute distress.     Appearance: She is not ill-appearing, toxic-appearing or diaphoretic.   Musculoskeletal:      Right elbow: Normal. No swelling, deformity, effusion or lacerations. Normal range of motion. No tenderness.      Right forearm: Normal. No swelling, edema, deformity, lacerations, tenderness or bony tenderness.      Right wrist: Tenderness present. No swelling, deformity, effusion, lacerations, bony tenderness, snuff box tenderness or crepitus. Normal range of motion. Abnormal pulse.      Right hand: Tenderness present. No swelling, deformity, lacerations or bony tenderness. Normal range of motion. Normal strength. Normal sensation. There is no disruption of two-point discrimination. Normal capillary refill. Normal pulse.        Hands:       Comments: LUE 2+ radial pulse no tendeness to radial head with ROM; patient able to oppose thumb to all digit lumbrical intact can extend against resistance no snuff box tenderness increased pain with flex and extendsion of wrist no edema erythema soft tissue compartments are soft to the forearm pronates and supinates easily   Neurological:      Mental Status: She is alert.         Vital Signs  ED Triage Vitals [07/19/24 1413]    Temperature Pulse Respirations Blood Pressure SpO2   98 °F (36.7 °C) 70 17 121/70 99 %      Temp src Heart Rate Source Patient Position - Orthostatic VS BP Location FiO2 (%)   -- -- -- -- --      Pain Score       --           Vitals:    07/19/24 1413   BP: 121/70   Pulse: 70         Visual Acuity      ED Medications  Medications   acetaminophen (TYLENOL) tablet 975 mg (975 mg Oral Given 7/19/24 1449)       Diagnostic Studies  Results Reviewed       None                   XR wrist 3+ views LEFT   ED Interpretation by Chanel Yeh MD (07/19 1506)   Per my independent interpretation. Radiologist to provide formal read. No acute fracture or dislocation      XR hand 3+ views LEFT   ED Interpretation by Chanel Yeh MD (07/19 1507)   Per my independent interpretation. Radiologist to provide formal read. No acute fracture                 Procedures  Procedures         ED Course  ED Course as of 07/19/24 1630   Fri Jul 19, 2024   1507 Reviewed prelim read of xrays show not fracture fitted with removable cockup splint to left wrist CMS intact after placement                                               Medical Decision Making  Mdm: no hx of trauma or triggering event. No skin deformity or effusion pain worsens with ROM of wrist c/w sprain. No cellulitis or evidence of compartment syndrome. Will proceed with plain film eval to check for unlikely stress fracture. reviewed with patient not c/w carpel tunnel and this involves extensor mechanism. Not c/w tick borne illness or septic arthritis. And plan on cockup splint can take NSAIDs if not pregnant. Followup with ortho in not improving    Amount and/or Complexity of Data Reviewed  Radiology: ordered and independent interpretation performed.    Risk  OTC drugs.                 Disposition  Final diagnoses:   Left wrist sprain     Time reflects when diagnosis was documented in both MDM as applicable and the Disposition within this note       Time User  Action Codes Description Comment    7/19/2024  2:34 PM Chanel Yeh Add [S63.502A] Left wrist sprain           ED Disposition       ED Disposition   Discharge    Condition   Stable    Date/Time   Fri Jul 19, 2024  3:07 PM    Comment   Evonne Awan discharge to home/self care.                   Follow-up Information       Follow up With Specialties Details Why Contact Info Additional Information    [facility] [place] [facility] In 3 days recheck of wrist/hand pain [address] [facility] [address] [phone] [facility] [place], [address] [place], [place], [address] [phone]            Discharge Medication List as of 7/19/2024  3:07 PM        CONTINUE these medications which have NOT CHANGED    Details   citalopram (CeleXA) 10 mg tablet Take 1 tablet (10 mg total) by mouth daily, Starting Fri 9/11/2020, Normal      ferrous sulfate 324 (65 Fe) mg Take 1 tablet (324 mg total) by mouth 2 (two) times a day before meals, Starting Fri 9/11/2020, Normal      hydrOXYzine pamoate (VISTARIL) 50 mg capsule Take 1 capsule (50 mg total) by mouth 3 (three) times a day as needed for anxiety, Starting Sat 7/25/2020, Normal      naproxen (NAPROSYN) 500 mg tablet Take 1 tablet (500 mg total) by mouth 2 (two) times a day as needed (for pain), Starting Fri 3/15/2019, Print      ondansetron (Zofran ODT) 8 mg disintegrating tablet Take 1 tablet (8 mg total) by mouth every 8 (eight) hours as needed for nausea or vomiting, Starting Mon 4/8/2024, Normal      ondansetron (ZOFRAN) 4 mg tablet Take 1 tablet (4 mg total) by mouth every 8 (eight) hours as needed for nausea or vomiting, Starting Mon 7/31/2023, Normal             No discharge procedures on file.    PDMP Review       None            ED Provider  Electronically Signed by             Chanel Yeh MD  07/19/24 2004

## 2024-09-29 ENCOUNTER — OFFICE VISIT (OUTPATIENT)
Dept: URGENT CARE | Facility: MEDICAL CENTER | Age: 24
End: 2024-09-29
Payer: COMMERCIAL

## 2024-09-29 VITALS
RESPIRATION RATE: 18 BRPM | WEIGHT: 192 LBS | OXYGEN SATURATION: 99 % | DIASTOLIC BLOOD PRESSURE: 63 MMHG | TEMPERATURE: 98.9 F | BODY MASS INDEX: 30.86 KG/M2 | SYSTOLIC BLOOD PRESSURE: 111 MMHG | HEART RATE: 74 BPM | HEIGHT: 66 IN

## 2024-09-29 DIAGNOSIS — Z3A.01 LESS THAN 8 WEEKS GESTATION OF PREGNANCY: Primary | ICD-10-CM

## 2024-09-29 PROCEDURE — G0382 LEV 3 HOSP TYPE B ED VISIT: HCPCS | Performed by: PHYSICIAN ASSISTANT

## 2024-09-29 RX ORDER — DIPHENHYDRAMINE HYDROCHLORIDE 25 MG/1
25 CAPSULE ORAL 3 TIMES DAILY
Qty: 90 TABLET | Refills: 0 | Status: SHIPPED | OUTPATIENT
Start: 2024-09-29 | End: 2024-10-29

## 2024-09-29 RX ORDER — ONDANSETRON 4 MG/1
4 TABLET, FILM COATED ORAL EVERY 8 HOURS PRN
Qty: 20 TABLET | Refills: 0 | Status: SHIPPED | OUTPATIENT
Start: 2024-09-29

## 2024-09-29 NOTE — PROGRESS NOTES
[facility]        NAME: Evonne Awan is a 24 y.o. female  : 2000    MRN: [id number]  DATE: 2024  TIME: 1:57 PM    Assessment and Plan   Less than 8 weeks gestation of pregnancy [Z3A.01]  1. Less than 8 weeks gestation of pregnancy  ondansetron (ZOFRAN) 4 mg tablet    doxylamine (UNISOM) 25 MG tablet    Pyridoxine HCl (vitamin B-6) 25 MG tablet            Patient Instructions     Take medicine as prescribed  Establish care with OB-GYN, local contact info given to patient  Follow up with PCP asap if unable to establish care with OB urgently  Proceed to  ER if symptoms worsen.    If tests have been performed at [facility], our office will contact you with results if changes need to be made to the care plan discussed with you at the visit.  You can [facility] full [facility].    Chief Complaint     Chief Complaint   Patient presents with    Vomiting         History of Present Illness       25yo F presents c/o persistent, severe nausea x 2 weeks.  Patient recently found out she is pregnant.  Her LMP began on 2024.  She has been able to eat and drink normally, but reports severe nausea that persists all day.  She had two episodes of emesis last week but otherwise has not vomited.  She endorses frequent dry heaving. She has not attempted any treatments.  She requests light duty work restriction as well.  She has not established with OB/GYN as she is uncertain if she will continue with pregnancy.        Review of Systems   Review of Systems   Constitutional:  Negative for activity change and appetite change.   Respiratory:  Negative for shortness of breath.    Gastrointestinal:  Positive for nausea and vomiting. Negative for abdominal pain and diarrhea.   Neurological:  Negative for weakness.         Current Medications       Current Outpatient Medications:     doxylamine (UNISOM) 25 MG tablet, Take 1 tablet (25 mg total) by mouth 3 (three) times a day as needed  "for nausea, Disp: 30 tablet, Rfl: 0    ondansetron (ZOFRAN) 4 mg tablet, Take 1 tablet (4 mg total) by mouth every 8 (eight) hours as needed for nausea or vomiting, Disp: 20 tablet, Rfl: 0    Pyridoxine HCl (vitamin B-6) 25 MG tablet, Take 1 tablet (25 mg total) by mouth 3 (three) times a day, Disp: 90 tablet, Rfl: 0    Current Allergies     Allergies as of 09/29/2024    (No Known Allergies)            The following portions of the patient's history were reviewed and updated as appropriate: allergies, current medications, past family history, past medical history, past social history, past surgical history and problem list.     Past Medical History:   Diagnosis Date    Anxiety        No past surgical history on file.    No family history on file.      Medications have been verified.        Objective   /63   Pulse 74   Temp 98.9 °F (37.2 °C)   Resp 18   Ht 5' 6\" (1.676 m)   Wt 87.1 kg (192 lb)   LMP 03/15/2024 (Approximate)   SpO2 99%   BMI 30.99 kg/m²   Patient's last menstrual period was 03/15/2024 (approximate).       Physical Exam     Physical Exam  Cardiovascular:      Rate and Rhythm: Normal rate and regular rhythm.      Heart sounds: Normal heart sounds. No murmur heard.     No friction rub. No gallop.   Pulmonary:      Effort: Pulmonary effort is normal. No respiratory distress.      Breath sounds: Normal breath sounds. No stridor. No wheezing, rhonchi or rales.   Abdominal:      General: Abdomen is flat. There is no distension.      Palpations: There is no mass.      Tenderness: There is no abdominal tenderness. There is no guarding or rebound.      Hernia: No hernia is present.   Neurological:      Mental Status: She is alert.                   "